# Patient Record
Sex: FEMALE | Race: BLACK OR AFRICAN AMERICAN | NOT HISPANIC OR LATINO | Employment: OTHER | URBAN - METROPOLITAN AREA
[De-identification: names, ages, dates, MRNs, and addresses within clinical notes are randomized per-mention and may not be internally consistent; named-entity substitution may affect disease eponyms.]

---

## 2021-01-09 PROBLEM — E11.9 TYPE 2 DIABETES MELLITUS: Status: ACTIVE | Noted: 2021-01-09

## 2021-01-09 PROBLEM — I21.4 NSTEMI (NON-ST ELEVATED MYOCARDIAL INFARCTION): Status: ACTIVE | Noted: 2021-01-09

## 2021-01-09 PROBLEM — G93.40 ENCEPHALOPATHY: Status: ACTIVE | Noted: 2021-01-09

## 2021-01-10 PROBLEM — Z85.79 HISTORY OF MULTIPLE MYELOMA: Status: ACTIVE | Noted: 2021-01-10

## 2021-01-10 PROBLEM — I10 ESSENTIAL HYPERTENSION: Status: ACTIVE | Noted: 2021-01-10

## 2021-01-11 PROBLEM — R79.89 ELEVATED TROPONIN: Status: ACTIVE | Noted: 2021-01-09

## 2021-01-18 PROBLEM — Z86.69 HISTORY OF MIGRAINE HEADACHES: Status: ACTIVE | Noted: 2021-01-18

## 2021-02-22 PROBLEM — E87.1 HYPONATREMIA: Status: ACTIVE | Noted: 2021-02-22

## 2021-02-22 PROBLEM — I63.9 ACUTE CVA (CEREBROVASCULAR ACCIDENT): Status: ACTIVE | Noted: 2021-02-22

## 2021-05-26 PROBLEM — R26.9 GAIT DIFFICULTY: Status: ACTIVE | Noted: 2021-05-26

## 2021-05-26 PROBLEM — R53.1 WEAKNESS: Status: ACTIVE | Noted: 2021-05-26

## 2021-05-28 PROBLEM — Z78.9 IMPAIRED MOBILITY AND ADLS: Status: ACTIVE | Noted: 2021-05-28

## 2021-05-28 PROBLEM — Z74.09 IMPAIRED MOBILITY AND ADLS: Status: ACTIVE | Noted: 2021-05-28

## 2021-09-22 PROBLEM — E87.6 HYPOKALEMIA: Status: ACTIVE | Noted: 2021-09-22
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
## 2021-09-22 PROBLEM — Z71.89 ADVANCE CARE PLANNING: Status: ACTIVE | Noted: 2021-09-22

## 2021-09-22 PROBLEM — I63.9 CVA (CEREBRAL VASCULAR ACCIDENT): Status: ACTIVE | Noted: 2021-09-22

## 2021-09-23 PROBLEM — I63.9 CVA (CEREBRAL VASCULAR ACCIDENT): Status: RESOLVED | Noted: 2021-09-22 | Resolved: 2021-09-23

## 2021-09-23 PROBLEM — R47.01 EXPRESSIVE APHASIA: Status: ACTIVE | Noted: 2021-09-23

## 2021-09-24 ENCOUNTER — TELEPHONE (OUTPATIENT)
Dept: NEUROLOGY | Facility: CLINIC | Age: 71
End: 2021-09-24

## 2021-09-25 PROBLEM — G93.40 ENCEPHALOPATHY: Status: RESOLVED | Noted: 2021-01-09 | Resolved: 2021-09-25

## 2021-10-04 PROBLEM — E83.42 HYPOMAGNESEMIA: Status: ACTIVE | Noted: 2021-10-04

## 2021-10-04 PROBLEM — I50.32 CHRONIC DIASTOLIC HEART FAILURE: Status: ACTIVE | Noted: 2021-10-04

## 2021-10-04 PROBLEM — G93.40 ENCEPHALOPATHY: Status: ACTIVE | Noted: 2021-10-04

## 2021-10-04 PROBLEM — N17.9 AKI (ACUTE KIDNEY INJURY): Status: ACTIVE | Noted: 2021-10-04

## 2021-10-05 PROBLEM — G92.9 ENCEPHALOPATHY, TOXIC: Status: ACTIVE | Noted: 2021-10-04

## 2021-10-05 PROBLEM — R00.0 SINUS TACHYCARDIA: Status: ACTIVE | Noted: 2021-10-05

## 2023-07-24 ENCOUNTER — OFFICE VISIT (OUTPATIENT)
Dept: PODIATRY | Facility: CLINIC | Age: 73
End: 2023-07-24
Payer: MEDICARE

## 2023-07-24 DIAGNOSIS — L60.3 ONYCHODYSTROPHY: ICD-10-CM

## 2023-07-24 DIAGNOSIS — E11.51 TYPE II DIABETES MELLITUS WITH PERIPHERAL CIRCULATORY DISORDER: Primary | ICD-10-CM

## 2023-07-24 DIAGNOSIS — L60.1 ONYCHOLYSIS: ICD-10-CM

## 2023-07-24 DIAGNOSIS — S90.211A CONTUSION OF RIGHT GREAT TOE WITH DAMAGE TO NAIL, INITIAL ENCOUNTER: ICD-10-CM

## 2023-07-24 PROCEDURE — 99999 PR PBB SHADOW E&M-EST. PATIENT-LVL III: ICD-10-PCS | Mod: PBBFAC,,, | Performed by: STUDENT IN AN ORGANIZED HEALTH CARE EDUCATION/TRAINING PROGRAM

## 2023-07-24 PROCEDURE — 99213 OFFICE O/P EST LOW 20 MIN: CPT | Mod: PBBFAC,PN | Performed by: STUDENT IN AN ORGANIZED HEALTH CARE EDUCATION/TRAINING PROGRAM

## 2023-07-24 PROCEDURE — 99203 PR OFFICE/OUTPT VISIT, NEW, LEVL III, 30-44 MIN: ICD-10-PCS | Mod: S$PBB,,, | Performed by: STUDENT IN AN ORGANIZED HEALTH CARE EDUCATION/TRAINING PROGRAM

## 2023-07-24 PROCEDURE — 99999 PR PBB SHADOW E&M-EST. PATIENT-LVL III: CPT | Mod: PBBFAC,,, | Performed by: STUDENT IN AN ORGANIZED HEALTH CARE EDUCATION/TRAINING PROGRAM

## 2023-07-24 PROCEDURE — 99203 OFFICE O/P NEW LOW 30 MIN: CPT | Mod: S$PBB,,, | Performed by: STUDENT IN AN ORGANIZED HEALTH CARE EDUCATION/TRAINING PROGRAM

## 2023-07-24 NOTE — PROGRESS NOTES
No chief complaint on file.             MEDICAL DECISION MAKING:      No diagnosis found.        Patient was evaluated and risk assessed today(7/24/23). The patient is at moderate risk for developing pedal complications. I explained to the patient that uncontrolled DMII, PAD, DMN etc can make healing injuries difficult leading to wounds or gangrene.  In general, I recommend monitoring the feet daily for any areas of pressure or injuries. If any areas appear to be healing slowly or become infected, seek medical attention as soon as possible. Wear supportive shoes and avoid barefoot walking.   Shoe inspection.     Continue good nutrition and blood sugar control to help prevent podiatric complications of diabetes.   Maintain proper foot hygiene.   Continue wearing proper shoe gear, daily foot inspections, never walking without protective shoe gear, never putting sharp instruments to feet.  F/u with Dr. Palacios in [place].    Saman Montiel DPM          HPI:   The patient is a 72 y.o.  female  who presents for a diabetic foot exam.     Patient reports + presence of abnormal sensation to the feet .    History of diabetic foot ulcers: negative   History of foot surgery: negative.     Shoes worn today:  slip on  + burning, numbness and tingling in the feet.  negative cramping or deep aching when walking.  Largely non ambulatory.    The patient is under the Active Care of Pernell Flores MD for the qualifying diagnosis of diabetes mellitus with N manifestations.  Patient was last seen on No chief complaint on file.  .          Patient Active Problem List   Diagnosis    Elevated troponin    Type 2 diabetes mellitus    Essential hypertension    History of multiple myeloma    NSTEMI (non-ST elevated myocardial infarction)    History of migraine headaches    Cerebrovascular accident (stroke)    Hyponatremia    Weakness    Gait difficulty    Impaired mobility and ADLs    Falls    Hypokalemia    Advance care planning    Expressive  aphasia    Encephalopathy, toxic    GIBRAN (acute kidney injury)    Hypomagnesemia    Chronic diastolic heart failure    Sinus tachycardia    Encephalopathy           Current Outpatient Medications on File Prior to Visit   Medication Sig Dispense Refill    acetaminophen (TYLENOL) 325 MG tablet Take 2 tablets (650 mg total) by mouth every 6 (six) hours as needed for Pain.  0    albuterol (PROVENTIL/VENTOLIN HFA) 90 mcg/actuation inhaler Inhale 2 puffs into the lungs every 4 (four) hours as needed for Shortness of Breath or Wheezing. 18 g 1    aspirin (ECOTRIN) 81 MG EC tablet Take 81 mg by mouth once daily.      atorvastatin (LIPITOR) 20 MG tablet Take 20 mg by mouth every evening.      cholestyramine (QUESTRAN) 4 gram packet Take 1 packet by mouth every meal as needed (diarrhea).      cloNIDine (CATAPRES) 0.1 MG tablet Take 1 tablet (0.1 mg total) by mouth every hour as needed (SBP >160 OR DBP >100). Take ONE tablet for SBP > 160 OR DBP >100. May repeat in 1 hour after first dose. 2 tablet 0    clopidogreL (PLAVIX) 75 mg tablet Take 75 mg by mouth once daily.      diclofenac sodium (VOLTAREN) 1 % Gel Apply 2 g topically as needed (JOINT PAIN). APPLY TO AFFECTED AREA      esomeprazole (NEXIUM) 40 MG capsule Take 1 capsule by mouth before breakfast.      isosorbide mononitrate (IMDUR) 60 MG 24 hr tablet Take 1 tablet (60 mg total) by mouth every evening. 30 tablet 0    LANCETS MISC Apply 1 applicator topically once as needed.      levothyroxine 150 mcg Cap Take 1 capsule by mouth once daily.      losartan (COZAAR) 100 MG tablet Take 1 tablet (100 mg total) by mouth once daily. 30 tablet 1    magnesium oxide 400 mg magnesium Tab Take 1 tablet by mouth once daily.      metFORMIN (GLUCOPHAGE) 500 MG tablet Take 1 tablet (500 mg total) by mouth daily with breakfast.      nitroGLYCERIN (NITROSTAT) 0.4 MG SL tablet Place 0.4 mg under the tongue every 5 (five) minutes as needed for Chest pain. After 3 consecutive doses, it  patient still has chest pain then seek emergency assessment.      nortriptyline (PAMELOR) 25 MG capsule Take 1 capsule (25 mg total) by mouth nightly. 90 capsule 0    oxyCODONE (ROXICODONE) 20 mg Tab immediate release tablet Take 1 tablet by mouth every 4 (four) hours as needed for Pain. for pain.      potassium chloride SA (K-DUR,KLOR-CON) 20 MEQ tablet Take 1 tablet (20 mEq total) by mouth 2 (two) times daily. 180 tablet 0    propranoloL (INDERAL) 60 MG tablet Take 60 mg by mouth once daily.      TRAVATAN Z 0.004 % ophthalmic solution Place 1 drop into both eyes every evening.      TRUE METRIX GLUCOSE METER Misc use as directed       No current facility-administered medications on file prior to visit.           Review of patient's allergies indicates:   Allergen Reactions    Amoxicillin Hives    Sulfa (sulfonamide antibiotics) Hives             ROS:  General ROS: negative  Respiratory ROS: no cough, shortness of breath, or wheezing  Cardiovascular ROS: no chest pain or dyspnea on exertion  Musculoskeletal ROS: negative  Neurological ROS:   positive for - numbness/tingling  Dermatological ROS: negative      LAST HbA1c:   Lab Results   Component Value Date    HGBA1C 6.4 (H) 07/17/2023           EXAM:   There were no vitals filed for this visit.    General: alert    Vascular:   Dorsalis Pedis:  diminished     Posterior Tibial:  diminished  Capillary refill time:  3 seconds  Temperature of toes cool to touch  Edema: Present bilaterally      Neurological:     Sharp touch:  normal  Light touch: normal  Tinels Sign:  Absent  [place]:  Present deficits to sharp/dull, light touch or vibratory sensation feet, ten points tested.    Present paresthesias (Abnormal spontaneous sensations in feet)        Dermatological:   Skin: thin and atrophic  Hair growth:  decreased  Wounds/Ulcers:  small partial thickness at the nailbed of the R great toe.  Bruising:  Present distal 1st R great toe tuft.  Erythema:  Absent  Toenails:    thickness:  thickened;   Yellowish and Brownish in color,  with subungual debris.   Absent paronychia + onycholysis.  Hyperkeratotic lesions to the n/a      Musculoskeletal:   none tenderness to palpation  ROM is full without pain or crepitation  Bunions:  Absent  Hammertoes: Absent